# Patient Record
Sex: MALE | URBAN - METROPOLITAN AREA
[De-identification: names, ages, dates, MRNs, and addresses within clinical notes are randomized per-mention and may not be internally consistent; named-entity substitution may affect disease eponyms.]

---

## 2021-02-24 ENCOUNTER — APPOINTMENT (RX ONLY)
Dept: URBAN - METROPOLITAN AREA CLINIC 56 | Facility: CLINIC | Age: 76
Setting detail: DERMATOLOGY
End: 2021-02-24

## 2021-02-24 DIAGNOSIS — D18.0 HEMANGIOMA: ICD-10-CM

## 2021-02-24 DIAGNOSIS — I87.2 VENOUS INSUFFICIENCY (CHRONIC) (PERIPHERAL): ICD-10-CM

## 2021-02-24 DIAGNOSIS — R60.0 LOCALIZED EDEMA: ICD-10-CM

## 2021-02-24 DIAGNOSIS — L81.4 OTHER MELANIN HYPERPIGMENTATION: ICD-10-CM

## 2021-02-24 PROBLEM — D18.01 HEMANGIOMA OF SKIN AND SUBCUTANEOUS TISSUE: Status: ACTIVE | Noted: 2021-02-24

## 2021-02-24 PROCEDURE — 99213 OFFICE O/P EST LOW 20 MIN: CPT

## 2021-02-24 PROCEDURE — ? PRESCRIPTION MEDICATION MANAGEMENT

## 2021-02-24 PROCEDURE — ? TREATMENT REGIMEN

## 2021-02-24 ASSESSMENT — LOCATION SIMPLE DESCRIPTION DERM
LOCATION SIMPLE: RIGHT CALF
LOCATION SIMPLE: RIGHT FOREARM
LOCATION SIMPLE: RIGHT CALF
LOCATION SIMPLE: LEFT FOREARM
LOCATION SIMPLE: LEFT UPPER BACK
LOCATION SIMPLE: LEFT CALF
LOCATION SIMPLE: LEFT CALF

## 2021-02-24 ASSESSMENT — LOCATION ZONE DERM
LOCATION ZONE: ARM
LOCATION ZONE: LEG
LOCATION ZONE: LEG
LOCATION ZONE: TRUNK

## 2021-02-24 ASSESSMENT — LOCATION DETAILED DESCRIPTION DERM
LOCATION DETAILED: LEFT PROXIMAL DORSAL FOREARM
LOCATION DETAILED: LEFT PROXIMAL CALF
LOCATION DETAILED: LEFT PROXIMAL CALF
LOCATION DETAILED: RIGHT DISTAL DORSAL FOREARM
LOCATION DETAILED: RIGHT PROXIMAL CALF
LOCATION DETAILED: RIGHT PROXIMAL CALF
LOCATION DETAILED: LEFT INFERIOR MEDIAL UPPER BACK

## 2021-02-24 NOTE — PROCEDURE: TREATMENT REGIMEN
Continue Regimen: Knee high support hose 30-40 millimeters mercury compression to be worn daily.  Patient will continue with mometasone 0.1% cream twice a day to areas of stasis dermatitis on the lower legs.
Detail Level: Zone

## 2021-02-24 NOTE — PROCEDURE: PRESCRIPTION MEDICATION MANAGEMENT
Render In Strict Bullet Format?: No
Continue Regimen: Mometasone Furoate 0.1% cream
Detail Level: Zone

## 2021-05-27 ENCOUNTER — RX ONLY (OUTPATIENT)
Age: 76
Setting detail: RX ONLY
End: 2021-05-27

## 2021-05-27 RX ORDER — MOMETASONE FUROATE 1 MG/G
CREAM TOPICAL
Qty: 1 | Refills: 6 | Status: ERX

## 2021-08-27 ENCOUNTER — APPOINTMENT (RX ONLY)
Dept: URBAN - METROPOLITAN AREA CLINIC 56 | Facility: CLINIC | Age: 76
Setting detail: DERMATOLOGY
End: 2021-08-27

## 2021-08-27 DIAGNOSIS — L82.1 OTHER SEBORRHEIC KERATOSIS: ICD-10-CM

## 2021-08-27 DIAGNOSIS — I87.2 VENOUS INSUFFICIENCY (CHRONIC) (PERIPHERAL): ICD-10-CM

## 2021-08-27 DIAGNOSIS — I89.0 LYMPHEDEMA, NOT ELSEWHERE CLASSIFIED: ICD-10-CM

## 2021-08-27 PROCEDURE — ? PRESCRIPTION MEDICATION MANAGEMENT

## 2021-08-27 PROCEDURE — ? COUNSELING

## 2021-08-27 PROCEDURE — 99213 OFFICE O/P EST LOW 20 MIN: CPT

## 2021-08-27 PROCEDURE — ? PRESCRIPTION

## 2021-08-27 RX ORDER — MOMETASONE FUROATE 1 MG/G
CREAM TOPICAL
Qty: 45 | Refills: 6 | Status: ERX

## 2021-08-27 ASSESSMENT — LOCATION DETAILED DESCRIPTION DERM: LOCATION DETAILED: LEFT MEDIAL FOREHEAD

## 2021-08-27 ASSESSMENT — LOCATION SIMPLE DESCRIPTION DERM: LOCATION SIMPLE: LEFT FOREHEAD

## 2021-08-27 ASSESSMENT — LOCATION ZONE DERM: LOCATION ZONE: FACE

## 2021-08-27 NOTE — PROCEDURE: PRESCRIPTION MEDICATION MANAGEMENT
Render In Strict Bullet Format?: No
Detail Level: Zone
Continue Regimen: Mometasone 0.1% Every Other Day to Legs

## 2022-03-24 ENCOUNTER — APPOINTMENT (RX ONLY)
Dept: URBAN - METROPOLITAN AREA CLINIC 56 | Facility: CLINIC | Age: 77
Setting detail: DERMATOLOGY
End: 2022-03-24

## 2022-03-24 DIAGNOSIS — D18.0 HEMANGIOMA: ICD-10-CM

## 2022-03-24 DIAGNOSIS — I89.0 LYMPHEDEMA, NOT ELSEWHERE CLASSIFIED: ICD-10-CM

## 2022-03-24 DIAGNOSIS — L82.1 OTHER SEBORRHEIC KERATOSIS: ICD-10-CM

## 2022-03-24 DIAGNOSIS — L81.4 OTHER MELANIN HYPERPIGMENTATION: ICD-10-CM

## 2022-03-24 DIAGNOSIS — I87.2 VENOUS INSUFFICIENCY (CHRONIC) (PERIPHERAL): ICD-10-CM

## 2022-03-24 PROBLEM — D18.01 HEMANGIOMA OF SKIN AND SUBCUTANEOUS TISSUE: Status: ACTIVE | Noted: 2022-03-24

## 2022-03-24 PROCEDURE — ? TREATMENT REGIMEN

## 2022-03-24 PROCEDURE — ? PRESCRIPTION MEDICATION MANAGEMENT

## 2022-03-24 PROCEDURE — 99213 OFFICE O/P EST LOW 20 MIN: CPT

## 2022-03-24 ASSESSMENT — LOCATION SIMPLE DESCRIPTION DERM
LOCATION SIMPLE: GLABELLA
LOCATION SIMPLE: RIGHT UPPER BACK

## 2022-03-24 ASSESSMENT — LOCATION ZONE DERM
LOCATION ZONE: TRUNK
LOCATION ZONE: FACE

## 2022-03-24 ASSESSMENT — LOCATION DETAILED DESCRIPTION DERM
LOCATION DETAILED: GLABELLA
LOCATION DETAILED: RIGHT SUPERIOR MEDIAL UPPER BACK

## 2022-03-24 NOTE — HPI: OTHER
Condition:: Follow-up stasis dermatitis
Please Describe Your Condition:: Patient returns for follow-up of his longstanding history of problems with stasis dermatitis, lower extremity edema and elephantiasis involving both lower legs.At his last appointment 6 months ago I recommend he see his internist about possibly getting his diuretics changed or dosage increased to try to improve the edema and he has not done this so far.  He continues with mometasone 0.1% cream q. OD and uses support hose.  He is not using leg elevation at home as much as I would like.  He has not noted any vesicles develop areas of erosion or blistering.  He denies any pain associated with his legs.  Patient also have general exam done today.

## 2022-03-24 NOTE — PROCEDURE: TREATMENT REGIMEN
Detail Level: Zone
Plan: Patient will continue with use of knee-high support hose daily.  He will try to increase the amount of leg elevation he uses at home.  I again recommended he make an appointment with his internist concerning change in his diuretic dose or change to a different diuretic medication to try to improve the edema.

## 2022-11-21 ENCOUNTER — APPOINTMENT (RX ONLY)
Dept: URBAN - METROPOLITAN AREA CLINIC 56 | Facility: CLINIC | Age: 77
Setting detail: DERMATOLOGY
End: 2022-11-21

## 2022-11-21 DIAGNOSIS — I87.2 VENOUS INSUFFICIENCY (CHRONIC) (PERIPHERAL): ICD-10-CM

## 2022-11-21 DIAGNOSIS — I89.0 LYMPHEDEMA, NOT ELSEWHERE CLASSIFIED: ICD-10-CM

## 2022-11-21 PROCEDURE — ? TREATMENT REGIMEN

## 2022-11-21 PROCEDURE — ? PRESCRIPTION

## 2022-11-21 PROCEDURE — 99213 OFFICE O/P EST LOW 20 MIN: CPT

## 2022-11-21 PROCEDURE — ? COUNSELING

## 2022-11-21 RX ORDER — MOMETASONE FUROATE 1 MG/G
CREAM TOPICAL
Qty: 45 | Refills: 5 | Status: ERX

## 2022-11-21 NOTE — PROCEDURE: TREATMENT REGIMEN
Detail Level: Zone
Samples Given: Aveeno Eczema Therapy lotion to use on alternating days with mometasone.
Plan: Patient will continue with use of knee-high support hose daily.  He will try to increase the amount of leg elevation he uses at home. Patient will continue use of knee-high support hose daily.  He feels his current support hose have enough compression that a new prescription is not necessary
Continue Regimen: Mometasone 0.1% Every Other Day to Legs.

## 2023-08-04 ENCOUNTER — APPOINTMENT (RX ONLY)
Dept: URBAN - METROPOLITAN AREA CLINIC 56 | Facility: CLINIC | Age: 78
Setting detail: DERMATOLOGY
End: 2023-08-04

## 2023-08-04 DIAGNOSIS — L0391 CELLULITIS AND ABSCESS OF UNSPECIFIED SITES: ICD-10-CM

## 2023-08-04 DIAGNOSIS — L0390 CELLULITIS AND ABSCESS OF UNSPECIFIED SITES: ICD-10-CM

## 2023-08-04 DIAGNOSIS — I87.2 VENOUS INSUFFICIENCY (CHRONIC) (PERIPHERAL): ICD-10-CM

## 2023-08-04 DIAGNOSIS — I89.0 LYMPHEDEMA, NOT ELSEWHERE CLASSIFIED: ICD-10-CM

## 2023-08-04 PROBLEM — L03.818 CELLULITIS OF OTHER SITES: Status: ACTIVE | Noted: 2023-08-04

## 2023-08-04 PROCEDURE — ? PRESCRIPTION

## 2023-08-04 PROCEDURE — 99214 OFFICE O/P EST MOD 30 MIN: CPT

## 2023-08-04 PROCEDURE — ? COUNSELING

## 2023-08-04 PROCEDURE — ? TREATMENT REGIMEN

## 2023-08-04 RX ORDER — TRIAMCINOLONE ACETONIDE 1 MG/G
CREAM TOPICAL
Qty: 454 | Refills: 2 | Status: ERX | COMMUNITY
Start: 2023-08-04

## 2023-08-04 RX ORDER — CEPHALEXIN 500 MG/1
CAPSULE ORAL TID
Qty: 42 | Refills: 0 | Status: ERX | COMMUNITY
Start: 2023-08-04

## 2023-08-04 RX ADMIN — TRIAMCINOLONE ACETONIDE: 1 CREAM TOPICAL at 00:00

## 2023-08-04 RX ADMIN — CEPHALEXIN: 500 CAPSULE ORAL at 00:00

## 2023-08-04 NOTE — HPI: OTHER
Condition:: Stasis dermatitis and cellulitis
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  Patient sustained several falls in June 2023 and neglected treatment of his lower legs subsequently.  He has noted increasing swelling of both lower legs with erythema and is noted several erosions develop about the left lower leg and areas of yellow crusting.  He also points out a decubitus ulcer on the plantar aspect of the left heel.  On questioning the patient he has not been doing leg elevation as I have requested to do it all his appointments in the past.  He does have support hose so it is not clear if he is overly compliant using these.  He is not allergic to cephalexin that he is aware of.  Treatment options were considered at this point and I felt we needed to get the cellulitis under control and then consider a series of Unna boot applications on his lower legs to see if this would be beneficial.

## 2023-08-04 NOTE — PROCEDURE: TREATMENT REGIMEN
Detail Level: Zone
Discontinue Regimen: Mometasone
Initiate Treatment: triamcinolone acetonide 0.1 % topical cream daily to legs
Plan: Patient will continue with use of knee-high support hose daily.  He will try to increase the amount of leg elevation he uses at home.  I recommend he consider purchasing a lazy boy recliner so he can get consistent leg elevation.A return appointment scheduled in 3 weeks for recheck.  At this point the plan then is to start a series of Unna boot applications to see if this would be beneficial.
Initiate Treatment: cephalexin 500 mg capsule TID for 2 weeks

## 2023-08-24 ENCOUNTER — APPOINTMENT (RX ONLY)
Dept: URBAN - METROPOLITAN AREA CLINIC 56 | Facility: CLINIC | Age: 78
Setting detail: DERMATOLOGY
End: 2023-08-24

## 2023-08-24 DIAGNOSIS — I87.2 VENOUS INSUFFICIENCY (CHRONIC) (PERIPHERAL): ICD-10-CM

## 2023-08-24 PROCEDURE — ? UNNA BOOT APPLICATION

## 2023-08-24 PROCEDURE — 29580 STRAPPING UNNA BOOT: CPT | Mod: 50

## 2023-08-24 ASSESSMENT — LOCATION SIMPLE DESCRIPTION DERM
LOCATION SIMPLE: RIGHT PRETIBIAL REGION
LOCATION SIMPLE: LEFT PRETIBIAL REGION

## 2023-08-24 ASSESSMENT — LOCATION DETAILED DESCRIPTION DERM
LOCATION DETAILED: RIGHT PROXIMAL PRETIBIAL REGION
LOCATION DETAILED: LEFT PROXIMAL PRETIBIAL REGION

## 2023-08-24 ASSESSMENT — LOCATION ZONE DERM: LOCATION ZONE: LEG

## 2023-08-24 NOTE — PROCEDURE: UNNA BOOT APPLICATION
Detail Level: Zone
Location Applied: both legs
Was A Previous Unna Boot Removed?: No
Medication Occluded Under Unnaboot: Bactroban and Triamcinolone
Additional Instructions: Patient will try to keep his legs elevated is much as possible.
Removal Of Previous Unna Boot (Yes) Text: The previous Unna Boot was removed and then the site was cleaned in preparation for another Unna Boot application.
Indication: cellulitis
Kerlix Units: 1
After Unna Boot Application Text: Coban was used to wrap the outside of Unna Boot and we ensured the Unna Boot wasn't too tight prior to the patient leaving the office.
Removal Of Previous Unna Boot (No) Text: The site was cleaned in preparation for an Unna Boot application.

## 2023-08-24 NOTE — HPI: OTHER
Condition:: Stasis dermatitis and ulcerations
Please Describe Your Condition:: Patient returns for follow-up of treatment of stasis dermatitis with ulcerations and elephantiasis nostraInvolving both lower legs.  At his last appointment he was noted to have cellulitis and was treated with cephalexin 500 mg 3 times a day for 14 days.  Patient is almost finished the course of treatment.  He mentions that he did have some diarrhea issues while on the antibiotic.  Use of Unna boot application to try to improve the condition was discussed with the patient.  Also discussed at length the need for leg elevation.  He is ordered a lazy boy recliner for his home to help get his legs up.

## 2023-08-31 ENCOUNTER — APPOINTMENT (RX ONLY)
Dept: URBAN - METROPOLITAN AREA CLINIC 56 | Facility: CLINIC | Age: 78
Setting detail: DERMATOLOGY
End: 2023-08-31

## 2023-08-31 DIAGNOSIS — I87.2 VENOUS INSUFFICIENCY (CHRONIC) (PERIPHERAL): ICD-10-CM

## 2023-08-31 PROCEDURE — 29580 STRAPPING UNNA BOOT: CPT | Mod: 50

## 2023-08-31 PROCEDURE — ? UNNA BOOT APPLICATION

## 2023-08-31 NOTE — HPI: OTHER
Condition:: Stasis dermatitis, ulcerations and elephantiasis nostra verucosa
Please Describe Your Condition:: Patient returns today for removal of Unna boots on both lower legs which were applied 1 week ago for treatment of stasis ulcerations associated with stasis dermatitis and elephantiasis nostra verucosa.  Patient denies much problems with the Unna boots in place over the past week other than some slipping of the top aspect.  He has been trying to keep his legs elevated is much as possible.

## 2023-08-31 NOTE — PROCEDURE: UNNA BOOT APPLICATION
Detail Level: Detailed
Location Applied: both legs
Indication: stasis ulcer
Medication Occluded Under Unnaboot: Bactroban and Triamcinolone
Additional Instructions: Encouraged thepatient to continue to try to keep his legs elevated is much as possible.
Was A Previous Unna Boot Removed?: Yes
After Unna Boot Application Text: Coban was used to wrap the outside of Unna Boot and we ensured the Unna Boot wasn't too tight prior to the patient leaving the office.
Removal Of Previous Unna Boot (Yes) Text: The previous Unna Boot was removed and then the site was cleaned in preparation for another Unna Boot application.
Removal Of Previous Unna Boot (No) Text: The site was cleaned in preparation for an Unna Boot application.
Bill For Unna Boot Supplies?: No
Coban Units: 1

## 2023-09-07 ENCOUNTER — APPOINTMENT (RX ONLY)
Dept: URBAN - METROPOLITAN AREA CLINIC 56 | Facility: CLINIC | Age: 78
Setting detail: DERMATOLOGY
End: 2023-09-07

## 2023-09-07 DIAGNOSIS — I87.2 VENOUS INSUFFICIENCY (CHRONIC) (PERIPHERAL): ICD-10-CM

## 2023-09-07 PROCEDURE — 29580 STRAPPING UNNA BOOT: CPT | Mod: 50

## 2023-09-07 PROCEDURE — ? UNNA BOOT APPLICATION

## 2023-09-07 NOTE — PROCEDURE: UNNA BOOT APPLICATION
Detail Level: Detailed
Location Applied: both legs
Indication: stasis ulcer
Medication Occluded Under Unnaboot: Bactroban and Triamcinolone
Additional Instructions: Encouraged thepatient to continue to try to keep his legs elevated is much as possible.
Other Medication Occluded Under Unnaboot: mupirocin and triamcinolone
Was A Previous Unna Boot Removed?: Yes
After Unna Boot Application Text: Coban was used to wrap the outside of Unna Boot and we ensured the Unna Boot wasn't too tight prior to the patient leaving the office.
Removal Of Previous Unna Boot (Yes) Text: The previous Unna Boot was removed and then the site was cleaned in preparation for another Unna Boot application.
Removal Of Previous Unna Boot (No) Text: The site was cleaned in preparation for an Unna Boot application.
Bill For Unna Boot Supplies?: No
Coban Units: 1

## 2023-09-07 NOTE — HPI: OTHER
Condition:: Stasis ulcerations
Please Describe Your Condition:: Patient returns for follow-up of treatment of stasis ulcerations, stasis dermatitis and elephantiasis nostra involving both lower legs.  He has now had Unna boot application done twice for 1 week duration each time and feels this has been helpful.  He denies any pain or significant itching associated with his lower legs.  He has ordered a reclining chair to keep his legs elevated during the daytime and is waiting for this to come in.

## 2023-09-14 ENCOUNTER — APPOINTMENT (RX ONLY)
Dept: URBAN - METROPOLITAN AREA CLINIC 56 | Facility: CLINIC | Age: 78
Setting detail: DERMATOLOGY
End: 2023-09-14

## 2023-09-14 DIAGNOSIS — I87.2 VENOUS INSUFFICIENCY (CHRONIC) (PERIPHERAL): ICD-10-CM

## 2023-09-14 PROCEDURE — 29580 STRAPPING UNNA BOOT: CPT | Mod: 50

## 2023-09-14 PROCEDURE — ? UNNA BOOT APPLICATION

## 2023-09-14 NOTE — HPI: OTHER
Condition:: Stasis ulcerations
Please Describe Your Condition:: Patient returns for follow-up of treatment of stasis ulcerations, stasis dermatitis and elephantiasis nostra involving both lower legs.  He is being treated with weekly Unna boot applications and feels that things are improving.  He denies any problems with the Unna boots in place over the past week.

## 2023-09-21 ENCOUNTER — APPOINTMENT (RX ONLY)
Dept: URBAN - METROPOLITAN AREA CLINIC 56 | Facility: CLINIC | Age: 78
Setting detail: DERMATOLOGY
End: 2023-09-21

## 2023-09-21 DIAGNOSIS — I87.2 VENOUS INSUFFICIENCY (CHRONIC) (PERIPHERAL): ICD-10-CM

## 2023-09-21 PROCEDURE — 99213 OFFICE O/P EST LOW 20 MIN: CPT

## 2023-09-21 PROCEDURE — ? TREATMENT REGIMEN

## 2023-09-21 ASSESSMENT — LOCATION DETAILED DESCRIPTION DERM
LOCATION DETAILED: LEFT DISTAL PRETIBIAL REGION
LOCATION DETAILED: RIGHT DISTAL PRETIBIAL REGION

## 2023-09-21 ASSESSMENT — LOCATION ZONE DERM: LOCATION ZONE: LEG

## 2023-09-21 ASSESSMENT — LOCATION SIMPLE DESCRIPTION DERM
LOCATION SIMPLE: RIGHT PRETIBIAL REGION
LOCATION SIMPLE: LEFT PRETIBIAL REGION

## 2023-09-21 NOTE — HPI: OTHER
Condition:: Stasis dermatitis and elephantiasis nostra
Please Describe Your Condition:: Patient returns for follow-up of treatment of stasis dermatitis, stasis ulcerations and elephantiasis nostra involving both lower legs.  He has now been through a series of Unna boot applications and feels there has been significant improvement.  He denies any pain or itching associated with the affected areas.  Patient previously had been using support hose so admits that the old support hose have worn out.

## 2023-09-21 NOTE — PROCEDURE: TREATMENT REGIMEN
Plan: Unna boots removed from both legs. Continue elevating legs as much as possible
Detail Level: Zone
Initiate Treatment: He was given a prescription for new compression stockings with 10-15mmhg compression
Continue Regimen: Continue applying triamcinolone 0.1% cream daily to lower legs.